# Patient Record
Sex: FEMALE | Race: WHITE
[De-identification: names, ages, dates, MRNs, and addresses within clinical notes are randomized per-mention and may not be internally consistent; named-entity substitution may affect disease eponyms.]

---

## 2021-02-26 ENCOUNTER — HOSPITAL ENCOUNTER (OUTPATIENT)
Dept: HOSPITAL 61 - LAB | Age: 66
End: 2021-02-26
Attending: SURGERY
Payer: COMMERCIAL

## 2021-02-26 DIAGNOSIS — Z01.812: Primary | ICD-10-CM

## 2021-02-26 DIAGNOSIS — Z20.822: ICD-10-CM

## 2021-02-26 DIAGNOSIS — K42.9: ICD-10-CM

## 2021-02-26 PROCEDURE — U0003 INFECTIOUS AGENT DETECTION BY NUCLEIC ACID (DNA OR RNA); SEVERE ACUTE RESPIRATORY SYNDROME CORONAVIRUS 2 (SARS-COV-2) (CORONAVIRUS DISEASE [COVID-19]), AMPLIFIED PROBE TECHNIQUE, MAKING USE OF HIGH THROUGHPUT TECHNOLOGIES AS DESCRIBED BY CMS-2020-01-R: HCPCS

## 2021-03-02 ENCOUNTER — HOSPITAL ENCOUNTER (OUTPATIENT)
Dept: HOSPITAL 61 - SURG | Age: 66
Discharge: HOME | End: 2021-03-02
Attending: SURGERY
Payer: COMMERCIAL

## 2021-03-02 VITALS — HEIGHT: 62 IN | WEIGHT: 192.9 LBS | BODY MASS INDEX: 35.5 KG/M2

## 2021-03-02 VITALS
DIASTOLIC BLOOD PRESSURE: 66 MMHG | SYSTOLIC BLOOD PRESSURE: 120 MMHG | DIASTOLIC BLOOD PRESSURE: 66 MMHG | SYSTOLIC BLOOD PRESSURE: 120 MMHG

## 2021-03-02 DIAGNOSIS — E03.9: ICD-10-CM

## 2021-03-02 DIAGNOSIS — K21.9: ICD-10-CM

## 2021-03-02 DIAGNOSIS — Z98.890: ICD-10-CM

## 2021-03-02 DIAGNOSIS — K42.9: Primary | ICD-10-CM

## 2021-03-02 DIAGNOSIS — Z88.2: ICD-10-CM

## 2021-03-02 DIAGNOSIS — Z72.89: ICD-10-CM

## 2021-03-02 DIAGNOSIS — E66.9: ICD-10-CM

## 2021-03-02 DIAGNOSIS — M19.90: ICD-10-CM

## 2021-03-02 DIAGNOSIS — Z79.899: ICD-10-CM

## 2021-03-02 DIAGNOSIS — I10: ICD-10-CM

## 2021-03-02 DIAGNOSIS — Z85.828: ICD-10-CM

## 2021-03-02 PROCEDURE — 49585: CPT

## 2021-03-02 PROCEDURE — A4364 ADHESIVE, LIQUID OR EQUAL: HCPCS

## 2021-03-02 PROCEDURE — A4452 WATERPROOF TAPE: HCPCS

## 2021-03-02 PROCEDURE — C1781 MESH (IMPLANTABLE): HCPCS

## 2021-03-02 NOTE — DISCH
DISCHARGE INSTRUCTIONS


Condition on Discharge


Condition on Discharge:  Stable





Activity After Discharge


Activity Instructions for Disc:  Other, see below (No lifting over 20 lbs X 4 

weeks)





Diet after Discharge


Diet after Discharge:  Regular





Wound Incision Care


Wound/Incision Care:  Other, see below (keep dressing clean and dry X 72 hours, 

may then remove and shower;  keep abdominal binder on while out of bed)





Follow-Up


Follow up with:  Dr Maldonado in office in 2 weeks, call for appointment 

[phone]











LUH MALDONADO MD              Mar 2, 2021 08:53

## 2021-03-02 NOTE — PDOC4
Operative Note


Operative Note


Operative Note:





Preoperative Diagnosis: Umbilical hernia





Postoperative Diagnosis: Umbilical hernia





Procedure: Umbilical hernia repair with mesh





Surgeon: Angelo





Assistant: Mei MAYES





Anesthesia: General





EBL: 10 mL





Specimen: None





Drains: None





Complications: None





Indication: The patient is a 65-year-old female who is referred with an 

umbilical hernia.  She was offered surgical repair.  The risks of surgery were 

discussed which include bleeding, infection, hernia recurrence, pain, visceral 

injury, anesthetic risk, potential need for additional surgery procedure.  She 

understands and would like to proceed.





Description: The patient was taken to the operating room and placed supine on 

the operating table.  General anesthesia was performed.  The abdomen was prepped

with ChloraPrep and draped with sterile towels, sheets, and an Ioban.  A curved 

infraumbilical incision was made in the skin with a scalpel.  Cautery dissection

was carried down to the fascia.  The umbilical tissue was elevated off the 

fascia exposing the hernia defect.  A preperitoneal plane was developed 

circumferentially around the defect with blunt and cautery dissection.  A medium

sized Ventralex ST mesh was then placed in this preperitoneal plane.  The mesh 

was sutured into position at the 12, 3, 6, 9 o'clock position using 0 Prolene 

placed in a horizontal mattress fashion.  The fascial edges were closed over the

mesh with 0 Prolene.  The umbilicus was secured back to the fascia with 0 

Vicryl.  The subcutaneous tissue was approximated with 3-0 Vicryl.  The skin was

closed with 4-0 Monocryl and infiltrated with half percent Marcaine with 

epinephrine.  Steri-Strips and a sterile dressing were applied.  The patient to

lerated the procedure well and was sent to the recovery room in stable 

condition.  At the end of the case all counts were correct.











LUH MALDONADO MD              Mar 2, 2021 08:51

## 2021-08-16 ENCOUNTER — HOSPITAL ENCOUNTER (OUTPATIENT)
Dept: HOSPITAL 61 - KCIC MRI | Age: 66
End: 2021-08-16
Attending: ORTHOPAEDIC SURGERY
Payer: COMMERCIAL

## 2021-08-16 DIAGNOSIS — M47.27: ICD-10-CM

## 2021-08-16 DIAGNOSIS — E88.2: ICD-10-CM

## 2021-08-16 DIAGNOSIS — M48.061: ICD-10-CM

## 2021-08-16 DIAGNOSIS — M47.25: Primary | ICD-10-CM

## 2021-08-16 DIAGNOSIS — M51.15: ICD-10-CM

## 2021-08-16 PROCEDURE — 72148 MRI LUMBAR SPINE W/O DYE: CPT

## 2021-08-16 NOTE — KCIC
MR LUMBAR SPINE [id number] 



Date: 8/16/2021 10:35 AM 



Indication:  LUMBAR RADICULOPATHY. Prev surgery 2019. Chronic right sided lower back pain into buttoc
k. 



Comparison:  5/28/2015. 



Technique: Multi-planar multi-weighted magnetic resonance imaging of the lumbar spine was performed w
ithout intravenous contrast using the standard lumbar spine protocol.



FINDINGS:



Transitional lumbosacral anatomy with the transitional element designated a lumbarized S1 vertebra wi
th rudimentary disc at S1-S2. Vertebral body numbering done in concordance with prior MRI 5/28/2015.



Postsurgical changes of posterior instrumentation at L5-S1 with interbody spacer.



Trace retrolisthesis at L2-3. No acute fracture. Moderate multilevel degenerative disc desiccation an
d disc height loss. Fatty degenerative endplate changes at L2-3. Trace degenerative endplate edema at
 L4-5.



The conus terminates at a normal level. No abnormal signal is seen within the visualized distal spina
l cord. No clumping of intrathecal nerve roots.



No soft tissue abnormality in the visualized abdomen or pelvis.



T12-L1: Disc bulge. Mild facet arthropathy. No significant spinal stenosis or neural foraminal narrow
ing. 



L1-L2: No disc bulge. Mild facet arthropathy. No significant spinal stenosis or neural foraminal narr
owing. 



L2-L3: Disc bulge. Mild facet arthropathy. No significant spinal stenosis. Mild right and moderate le
ft neural foraminal narrowing. 



L3-L4: Disc bulge. Mild facet arthropathy. No significant spinal stenosis or neural foraminal narrowi
ng. 



L4-L5: Disc bulge. Mild facet arthropathy. Ligamentum flavum thickening. Prominent dorsal epidural fa
t. Moderate to severe spinal stenosis. Moderate to severe bilateral neural foraminal narrowing. 



L5-S1: Mild facet arthropathy. No significant spinal stenosis or neural foraminal narrowing. 



IMPRESSION:



Moderate to severe spinal canal stenosis at L4-5 due to degenerative changes and epidural lipomatosis
, progressed from 2015.



Electronically signed by: Julien Castillo MD (8/16/2021 3:32 PM) [facility]